# Patient Record
Sex: MALE | Race: BLACK OR AFRICAN AMERICAN | NOT HISPANIC OR LATINO | URBAN - METROPOLITAN AREA
[De-identification: names, ages, dates, MRNs, and addresses within clinical notes are randomized per-mention and may not be internally consistent; named-entity substitution may affect disease eponyms.]

---

## 2020-06-03 ENCOUNTER — TELEPHONE ENCOUNTER (OUTPATIENT)
Dept: URBAN - METROPOLITAN AREA CLINIC 35 | Facility: CLINIC | Age: 80
End: 2020-06-03

## 2020-06-03 ENCOUNTER — OFFICE VISIT (OUTPATIENT)
Dept: URBAN - METROPOLITAN AREA CLINIC 33 | Facility: CLINIC | Age: 80
End: 2020-06-03

## 2020-06-03 VITALS — HEIGHT: 68 IN | BODY MASS INDEX: 29.7 KG/M2 | WEIGHT: 196 LBS

## 2020-06-03 RX ORDER — TRIAMTERENE AND HYDROCHLOROTHIAZIDE 37.5; 25 MG/1; MG/1
1 TABLET IN THE MORNING TABLET ORAL ONCE A DAY
Status: ACTIVE | COMMUNITY

## 2020-06-03 RX ORDER — POLYETHYLENE GLYCOL 3350, SODIUM SULFATE ANHYDROUS, SODIUM BICARBONATE, SODIUM CHLORIDE, POTASSIUM CHLORIDE 236; 22.74; 6.74; 5.86; 2.97 G/4L; G/4L; G/4L; G/4L; G/4L
AS DIRECTED POWDER, FOR SOLUTION ORAL
Qty: 1 KIT | Refills: 0 | OUTPATIENT
Start: 2020-06-03

## 2020-06-03 RX ORDER — LEVOTHYROXINE SODIUM 125 UG/1
1 TABLET TABLET ORAL ONCE A DAY
Status: ACTIVE | COMMUNITY

## 2020-06-03 RX ORDER — ALLOPURINOL 300 MG/1
1 TABLET TABLET ORAL ONCE A DAY
Qty: 30 | Status: ACTIVE | COMMUNITY

## 2020-06-03 RX ORDER — CALCITRIOL 0.25 UG/1
1 CAPSULE CAPSULE ORAL ONCE A DAY
Qty: 30 | Status: ACTIVE | COMMUNITY

## 2020-06-03 RX ORDER — CARVEDILOL 25 MG/1
1 TABLET WITH FOOD TABLET, FILM COATED ORAL TWICE A DAY
Status: ACTIVE | COMMUNITY

## 2020-06-03 RX ORDER — BLACK COHOSH ROOT EXTRACT 80 MG
CAPSULE ORAL
Status: ACTIVE | COMMUNITY

## 2020-06-03 RX ORDER — ORGAN CONCENTRATES 200 MG
AS DIRECTED CAPSULE ORAL
Status: ACTIVE | COMMUNITY

## 2020-06-03 RX ORDER — SODIUM PICOSULFATE, MAGNESIUM OXIDE, AND ANHYDROUS CITRIC ACID 10; 3.5; 12 MG/16.1G; G/16.1G; G/16.1G
AS DIRECTED POWDER, METERED ORAL 1
Qty: 1 | Refills: 0 | Status: DISCONTINUED | COMMUNITY
Start: 2017-01-05

## 2020-06-03 RX ORDER — FINASTERIDE 5 MG/1
1 TABLET TABLET, FILM COATED ORAL ONCE A DAY
Qty: 30 | Status: ACTIVE | COMMUNITY

## 2020-06-03 RX ORDER — MULTIVIT WITH MINERALS/LUTEIN
TABLET ORAL
Status: ACTIVE | COMMUNITY

## 2020-06-03 RX ORDER — CALCITRIOL 0.5 UG/1
1 CAPSULE CAPSULE ORAL ONCE A DAY
Status: ON HOLD | COMMUNITY

## 2020-06-03 RX ORDER — AMLODIPINE BESYLATE 5 MG/1
1 TABLET TABLET ORAL ONCE A DAY
Status: ACTIVE | COMMUNITY

## 2020-06-03 RX ORDER — FEBUXOSTAT 80 MG/1
1 TABLET TABLET ORAL ONCE A DAY
Status: DISCONTINUED | COMMUNITY

## 2020-06-03 NOTE — HPI-MIGRATED HPI
;   ;     Surveillance Colonoscopy : Patient presents today via tele visit with consent for a surveillance colonoscopy. Patient's last colonoscopy was completed in 2015 with Dr. Perez revealing  the cecum two semi-pedunculated polyps measuring 10 mm and 9.5 mm seen and removed. In the cecum a 3 mm polyp was seen and removed. In the hepatic flexure a 5.5 mm polyp was seen and removed. In the descending colon a 6 mm polyp was seen and removed. In the sigmoid colon a 7 mm polyp was seen and removed. In the sigmoid colon seven polyps seen and removed. Three less than 5 mm polyps measuring less than 5 mm were seen and removed. Moderate diverticulosis found in the descending colon and sigmoid colon. Internal hemorrhoids found in the rectum.   He currently reports 1 bowel movement a day without strain. Stools are formed and occasional hard due to medication that he is taking.   He reports that he had labs completed 2-3 weeks ago and reports his Hgb was 9, and no history of anemia. He is getting redraw.    Last visit (1/5/2017) Patient is a 76 year old  male who presents today for consultation for a colonoscopy.  He has a personal history of colonic polyps and his last colonoscopy was 2015 by Dr. Perez.  Patient denies a family history of colon polyps and cancers.  Patient currently admits 1 bowel movement per day.  Stools are normal and formed.  Patient denies any blood or mucus in stool, or melena. Patient denies any heartburn.   4/24/2015- Colonoscopy revealed in the cecum two semi-pedunculated polyps measuring 10 mm and 9.5 mm seen and removed. In the cecum a 3 mm polyp was seen and removed. In the hepatic flexure a 5.5 mm polyp was seen and removed. In the descending colon a 6 mm polyp was seen and removed. In the sigmoid colon a 7 mm polyp was seen and removed. In the sigmoid colon seven polyps seen and removed. Three less than 5 mm polyps measuring less than 5 mm were seen and removed. Moderate diverticulosis found in the descending colon and sigmoid colon. Internal hemorrhoids found in the rectum. ;   Anemia : Patient had recent labs and was told he was newly anemic.  He is not on any blood thinners.  He denies feeling weak, dizzy, lightheaded, or seeing any blood in his stool.  He is going to repeat bloodwork in another couple weeks with his nephrologist.  He has never had an EGD.;

## 2020-06-23 ENCOUNTER — TELEPHONE ENCOUNTER (OUTPATIENT)
Dept: URBAN - METROPOLITAN AREA CLINIC 35 | Facility: CLINIC | Age: 80
End: 2020-06-23

## 2020-06-26 ENCOUNTER — OFFICE VISIT (OUTPATIENT)
Dept: URBAN - METROPOLITAN AREA MEDICAL CENTER 10 | Facility: MEDICAL CENTER | Age: 80
End: 2020-06-26

## 2020-07-10 ENCOUNTER — OFFICE VISIT (OUTPATIENT)
Dept: URBAN - METROPOLITAN AREA CLINIC 35 | Facility: CLINIC | Age: 80
End: 2020-07-10

## 2020-07-10 VITALS
SYSTOLIC BLOOD PRESSURE: 160 MMHG | WEIGHT: 192 LBS | DIASTOLIC BLOOD PRESSURE: 86 MMHG | BODY MASS INDEX: 29.1 KG/M2 | HEIGHT: 68 IN

## 2020-07-10 RX ORDER — AMLODIPINE BESYLATE 5 MG/1
1 TABLET TABLET ORAL ONCE A DAY
Status: ACTIVE | COMMUNITY

## 2020-07-10 RX ORDER — FINASTERIDE 5 MG/1
1 TABLET TABLET, FILM COATED ORAL ONCE A DAY
Qty: 30 | Status: ACTIVE | COMMUNITY

## 2020-07-10 RX ORDER — CALCITRIOL 0.5 UG/1
1 CAPSULE CAPSULE ORAL ONCE A DAY
Status: ON HOLD | COMMUNITY

## 2020-07-10 RX ORDER — TRIAMTERENE AND HYDROCHLOROTHIAZIDE 37.5; 25 MG/1; MG/1
1 TABLET IN THE MORNING TABLET ORAL ONCE A DAY
Status: ACTIVE | COMMUNITY

## 2020-07-10 RX ORDER — MULTIVIT WITH MINERALS/LUTEIN
TABLET ORAL
Status: DISCONTINUED | COMMUNITY

## 2020-07-10 RX ORDER — POLYETHYLENE GLYCOL 3350, SODIUM SULFATE ANHYDROUS, SODIUM BICARBONATE, SODIUM CHLORIDE, POTASSIUM CHLORIDE 236; 22.74; 6.74; 5.86; 2.97 G/4L; G/4L; G/4L; G/4L; G/4L
AS DIRECTED POWDER, FOR SOLUTION ORAL
Qty: 1 KIT | Refills: 0 | Status: DISCONTINUED | COMMUNITY
Start: 2020-06-03

## 2020-07-10 RX ORDER — ALLOPURINOL 300 MG/1
1 TABLET TABLET ORAL ONCE A DAY
Qty: 30 | Status: ACTIVE | COMMUNITY

## 2020-07-10 RX ORDER — CARVEDILOL 25 MG/1
1 TABLET WITH FOOD TABLET, FILM COATED ORAL TWICE A DAY
Status: ACTIVE | COMMUNITY

## 2020-07-10 RX ORDER — ORGAN CONCENTRATES 200 MG
AS DIRECTED CAPSULE ORAL
Status: ACTIVE | COMMUNITY

## 2020-07-10 RX ORDER — CALCITRIOL 0.25 UG/1
1 CAPSULE CAPSULE ORAL ONCE A DAY
Qty: 30 | Status: ACTIVE | COMMUNITY

## 2020-07-10 RX ORDER — LEVOTHYROXINE SODIUM 125 UG/1
1 TABLET TABLET ORAL ONCE A DAY
Status: ACTIVE | COMMUNITY

## 2020-07-10 RX ORDER — BLACK COHOSH ROOT EXTRACT 80 MG
CAPSULE ORAL
Status: ACTIVE | COMMUNITY

## 2020-07-10 RX ORDER — FESOTERODINE FUMARATE 8 MG/1
TAKE ONE TABLET BY MOUTH ONE TIME DAILY TABLET, FILM COATED, EXTENDED RELEASE ORAL
Qty: 30 UNSPECIFIED | Refills: 7 | Status: ACTIVE | COMMUNITY

## 2020-07-10 NOTE — HPI-MIGRATED HPI
;   ;     Surveillance Colonoscopy : Patient presents today after his colonoscopy.  He denies any complications following his procedure.  Currently admit 1 normal and formed bowel movement per day.  Denies melena, blood or mucus in stools.    Last visit (6/3/2020) Patient presents today via tele visit with consent for a surveillance colonoscopy. Patient's last colonoscopy was completed in 2015 with Dr. Perez revealing  the cecum two semi-pedunculated polyps measuring 10 mm and 9.5 mm seen and removed. In the cecum a 3 mm polyp was seen and removed. In the hepatic flexure a 5.5 mm polyp was seen and removed. In the descending colon a 6 mm polyp was seen and removed. In the sigmoid colon a 7 mm polyp was seen and removed. In the sigmoid colon seven polyps seen and removed. Three less than 5 mm polyps measuring less than 5 mm were seen and removed. Moderate diverticulosis found in the descending colon and sigmoid colon. Internal hemorrhoids found in the rectum.   He currently reports 1 bowel movement a day without strain. Stools are formed and occasional hard due to medication that he is taking.   He reports that he had labs completed 2-3 weeks ago and reports his Hgb was 9, and no history of anemia. He is getting redraw.    Last visit (1/5/2017) Patient is a 76 year old  male who presents today for consultation for a colonoscopy.  He has a personal history of colonic polyps and his last colonoscopy was 2015 by Dr. Perez.  Patient denies a family history of colon polyps and cancers.  Patient currently admits 1 bowel movement per day.  Stools are normal and formed.  Patient denies any blood or mucus in stool, or melena. Patient denies any heartburn.   4/24/2015- Colonoscopy revealed in the cecum two semi-pedunculated polyps measuring 10 mm and 9.5 mm seen and removed. In the cecum a 3 mm polyp was seen and removed. In the hepatic flexure a 5.5 mm polyp was seen and removed. In the descending colon a 6 mm polyp was seen and removed. In the sigmoid colon a 7 mm polyp was seen and removed. In the sigmoid colon seven polyps seen and removed. Three less than 5 mm polyps measuring less than 5 mm were seen and removed. Moderate diverticulosis found in the descending colon and sigmoid colon. Internal hemorrhoids found in the rectum. ;   Anemia : Denies completing labs since last visit.  He continue to deny feeling weak, dizzy, lightheaded, or seeing any blood in his stool.    Last visit (6/3/2020) Most recent labs with his nephrologist.  He has never had an EGD.Patient had recent labs and was told he was newly anemic. He is not on any blood thinners. He denies feeling weak, dizzy, lightheaded, or seeing any blood in his stool. He is going to repeat bloodwork in another couple weeks with his nephrologist. He has never had an EGD.;    None

## 2020-07-20 ENCOUNTER — TELEPHONE ENCOUNTER (OUTPATIENT)
Dept: URBAN - METROPOLITAN AREA CLINIC 35 | Facility: CLINIC | Age: 80
End: 2020-07-20

## 2020-08-26 ENCOUNTER — TELEPHONE ENCOUNTER (OUTPATIENT)
Dept: URBAN - METROPOLITAN AREA CLINIC 35 | Facility: CLINIC | Age: 80
End: 2020-08-26

## 2020-09-10 ENCOUNTER — TELEPHONE ENCOUNTER (OUTPATIENT)
Dept: URBAN - METROPOLITAN AREA CLINIC 35 | Facility: CLINIC | Age: 80
End: 2020-09-10

## 2020-09-10 ENCOUNTER — OFFICE VISIT (OUTPATIENT)
Dept: URBAN - METROPOLITAN AREA CLINIC 35 | Facility: CLINIC | Age: 80
End: 2020-09-10

## 2020-09-10 VITALS
HEIGHT: 68 IN | BODY MASS INDEX: 29.55 KG/M2 | DIASTOLIC BLOOD PRESSURE: 59 MMHG | SYSTOLIC BLOOD PRESSURE: 135 MMHG | WEIGHT: 195 LBS

## 2020-09-10 PROBLEM — 398050005 DIVERTICULAR DISEASE OF COLON: Status: ACTIVE | Noted: 2020-07-10

## 2020-09-10 RX ORDER — FESOTERODINE FUMARATE 8 MG/1
1 TABLET TABLET, FILM COATED, EXTENDED RELEASE ORAL ONCE A DAY
Status: ACTIVE | COMMUNITY

## 2020-09-10 RX ORDER — CALCITRIOL 0.25 UG/1
1 CAPSULE CAPSULE ORAL EVERY OTHER DAY
Status: ACTIVE | COMMUNITY

## 2020-09-10 RX ORDER — TRIAMTERENE AND HYDROCHLOROTHIAZIDE 37.5; 25 MG/1; MG/1
1 TABLET IN THE MORNING TABLET ORAL ONCE A DAY
Status: ACTIVE | COMMUNITY

## 2020-09-10 RX ORDER — LEVOTHYROXINE SODIUM 125 UG/1
1 TABLET TABLET ORAL ONCE A DAY
Status: ACTIVE | COMMUNITY

## 2020-09-10 RX ORDER — AMLODIPINE BESYLATE 5 MG/1
1 TABLET TABLET ORAL ONCE A DAY
Status: ACTIVE | COMMUNITY

## 2020-09-10 RX ORDER — CALCITRIOL 0.5 UG/1
1 CAPSULE CAPSULE ORAL ONCE A DAY
COMMUNITY

## 2020-09-10 RX ORDER — CARVEDILOL 25 MG/1
1 TABLET WITH FOOD TABLET, FILM COATED ORAL TWICE A DAY
Status: ACTIVE | COMMUNITY

## 2020-09-10 RX ORDER — ORGAN CONCENTRATES 200 MG
AS DIRECTED CAPSULE ORAL
Status: ACTIVE | COMMUNITY

## 2020-09-10 RX ORDER — FINASTERIDE 5 MG/1
1 TABLET TABLET, FILM COATED ORAL ONCE A DAY
Qty: 30 | Status: ACTIVE | COMMUNITY

## 2020-09-10 RX ORDER — MULTIVITAMIN
1 TABLET TABLET ORAL ONCE A DAY
Qty: 30 | Status: ACTIVE | COMMUNITY

## 2020-09-10 RX ORDER — ALLOPURINOL 300 MG/1
1 TABLET TABLET ORAL ONCE A DAY
Qty: 30 | Status: ACTIVE | COMMUNITY

## 2020-09-10 RX ORDER — BLACK COHOSH ROOT EXTRACT 80 MG
CAPSULE ORAL ONCE A DAY
Status: ACTIVE | COMMUNITY

## 2020-09-10 NOTE — HPI-MIGRATED HPI
;     Anemia : Patient presents today for a follow up of anemia. Patient's most recent lab was done on 08/02/2020 with nephrology, [place] Cadena MD with results of RBC 2.7 and Hemoglobin 8.9 per patient. Then, he was recommended to follow up with hematology. His first appointment with hematology is on 09/18/2020 at [facility] [place].   Patient denies the use of iron tablets/supplements. Patient admits fatigue.  Patient denies NSAID use, a history of GI bleeding, any blood or melena in stools, epigastric/abdominal pains, cold extremities, light headedness, dizziness.  Patient states that he never had an EGD before.  Last visit (07/10/2020)             Denies completing labs since last visit.  He continue to deny feeling weak, dizzy, lightheaded, or seeing any blood in his stool.   Last visit (6/3/2020) Most recent labs with his nephrologist.  He has never had an EGD.Patient had recent labs and was told he was newly anemic. He is not on any blood thinners. He denies feeling weak, dizzy, lightheaded, or seeing any blood in his stool. He is going to repeat bloodwork in another couple weeks with his nephrologist. He has never had an EGD.;

## 2020-09-11 ENCOUNTER — TELEPHONE ENCOUNTER (OUTPATIENT)
Dept: URBAN - METROPOLITAN AREA CLINIC 35 | Facility: CLINIC | Age: 80
End: 2020-09-11

## 2020-10-30 ENCOUNTER — OFFICE VISIT (OUTPATIENT)
Dept: URBAN - METROPOLITAN AREA MEDICAL CENTER 10 | Facility: MEDICAL CENTER | Age: 80
End: 2020-10-30

## 2020-11-16 ENCOUNTER — TELEPHONE ENCOUNTER (OUTPATIENT)
Dept: URBAN - METROPOLITAN AREA CLINIC 35 | Facility: CLINIC | Age: 80
End: 2020-11-16

## 2020-11-16 ENCOUNTER — OFFICE VISIT (OUTPATIENT)
Dept: URBAN - METROPOLITAN AREA CLINIC 35 | Facility: CLINIC | Age: 80
End: 2020-11-16

## 2020-11-16 VITALS
WEIGHT: 199 LBS | HEIGHT: 68 IN | SYSTOLIC BLOOD PRESSURE: 117 MMHG | TEMPERATURE: 97.7 F | BODY MASS INDEX: 30.16 KG/M2 | DIASTOLIC BLOOD PRESSURE: 68 MMHG

## 2020-11-16 PROBLEM — 428283002 HISTORY OF POLYP OF COLON (SITUATION): Status: ACTIVE | Noted: 2017-01-05

## 2020-11-16 PROBLEM — 196735001 CSG - CHRONIC SUPERFICIAL GASTRITIS: Status: ACTIVE | Noted: 2020-11-16

## 2020-11-16 PROBLEM — 92411005 BENIGN NEOPLASM OF STOMACH: Status: ACTIVE | Noted: 2020-11-16

## 2020-11-16 RX ORDER — CARVEDILOL 25 MG/1
1 TABLET WITH FOOD TABLET, FILM COATED ORAL TWICE A DAY
Status: ACTIVE | COMMUNITY

## 2020-11-16 RX ORDER — CALCITRIOL 0.25 UG/1
1 CAPSULE CAPSULE ORAL EVERY OTHER DAY
Status: ACTIVE | COMMUNITY

## 2020-11-16 RX ORDER — AMLODIPINE BESYLATE 5 MG/1
1 TABLET TABLET ORAL ONCE A DAY
Status: ACTIVE | COMMUNITY

## 2020-11-16 RX ORDER — BLACK COHOSH ROOT EXTRACT 80 MG
CAPSULE ORAL ONCE A DAY
Status: ACTIVE | COMMUNITY

## 2020-11-16 RX ORDER — CALCITRIOL 0.5 UG/1
1 CAPSULE CAPSULE ORAL ONCE A DAY
COMMUNITY

## 2020-11-16 RX ORDER — TRIAMTERENE AND HYDROCHLOROTHIAZIDE 37.5; 25 MG/1; MG/1
1 TABLET IN THE MORNING TABLET ORAL ONCE A DAY
Status: ACTIVE | COMMUNITY

## 2020-11-16 RX ORDER — ORGAN CONCENTRATES 200 MG
AS DIRECTED CAPSULE ORAL
Status: ACTIVE | COMMUNITY

## 2020-11-16 RX ORDER — FINASTERIDE 5 MG/1
1 TABLET TABLET, FILM COATED ORAL ONCE A DAY
Qty: 30 | Status: ACTIVE | COMMUNITY

## 2020-11-16 RX ORDER — MULTIVITAMIN
1 TABLET TABLET ORAL ONCE A DAY
Qty: 30 | Status: ACTIVE | COMMUNITY

## 2020-11-16 RX ORDER — ALLOPURINOL 300 MG/1
1 TABLET TABLET ORAL ONCE A DAY
Qty: 30 | Status: ACTIVE | COMMUNITY

## 2020-11-16 RX ORDER — FESOTERODINE FUMARATE 8 MG/1
1 TABLET TABLET, FILM COATED, EXTENDED RELEASE ORAL ONCE A DAY
Status: ACTIVE | COMMUNITY

## 2020-11-16 RX ORDER — ERYTHROPOIETIN 40000 [IU]/ML
AS DIRECTED INJECTION, SOLUTION INTRAVENOUS; SUBCUTANEOUS EVERY 2 WEEKS
Status: ACTIVE | COMMUNITY

## 2020-11-16 RX ORDER — LEVOTHYROXINE SODIUM 125 UG/1
1 TABLET TABLET ORAL ONCE A DAY
Status: ACTIVE | COMMUNITY

## 2020-11-16 NOTE — HPI-MIGRATED HPI
;     Anemia : Patient is a 80-year-old male, who presents today to follow up s/p EGD. EGD performed on 10/30/2020. Patient denies any complications after the procedure. He had additional blood work on 11/06/2020 at [facility] with results of hgb 9.1.  He was diagnosed in November with multiple myeloma.  Admits some fatigue. Patient currently states that he gets procrit injection every 2 weeks and that he has not had a transfusion. Patient denies NSAID use, a history of GI bleeding, any blood or melena in stools, SOB, epigastric/abdominal pains, cold extremities, light headedness, dizziness.  Last visit (09/10/2020)              Patient presents today for a follow up of anemia. Patient's most recent lab was done on 08/02/2020 with nephrology, [place] Cadena MD with results of RBC 2.7 and Hemoglobin 8.9 per patient. Then, he was recommended to follow up with hematology. His first appointment with hematology is on 09/18/2020 at [facility] [place].   Patient denies the use of iron tablets/supplements. Patient admits fatigue.  Patient denies NSAID use, a history of GI bleeding, any blood or melena in stools, epigastric/abdominal pains, cold extremities, light headedness, dizziness.  Patient states that he never had an EGD before.  Last visit (07/10/2020)             Denies completing labs since last visit.  He continue to deny feeling weak, dizzy, lightheaded, or seeing any blood in his stool.   Last visit (6/3/2020) Most recent labs with his nephrologist.  He has never had an EGD.Patient had recent labs and was told he was newly anemic. He is not on any blood thinners. He denies feeling weak, dizzy, lightheaded, or seeing any blood in his stool. He is going to repeat bloodwork in another couple weeks with his nephrologist. He has never had an EGD.;

## 2023-07-03 ENCOUNTER — OFFICE VISIT (OUTPATIENT)
Dept: URBAN - METROPOLITAN AREA CLINIC 35 | Facility: CLINIC | Age: 83
End: 2023-07-03

## 2023-07-07 ENCOUNTER — TELEPHONE ENCOUNTER (OUTPATIENT)
Dept: URBAN - METROPOLITAN AREA CLINIC 35 | Facility: CLINIC | Age: 83
End: 2023-07-07

## 2023-07-07 ENCOUNTER — OFFICE VISIT (OUTPATIENT)
Dept: URBAN - METROPOLITAN AREA CLINIC 35 | Facility: CLINIC | Age: 83
End: 2023-07-07
Payer: MEDICARE

## 2023-07-07 VITALS
HEIGHT: 68 IN | DIASTOLIC BLOOD PRESSURE: 64 MMHG | BODY MASS INDEX: 23.95 KG/M2 | SYSTOLIC BLOOD PRESSURE: 118 MMHG | WEIGHT: 158 LBS

## 2023-07-07 DIAGNOSIS — K92.1 MELENA: ICD-10-CM

## 2023-07-07 DIAGNOSIS — D64.9 ABSOLUTE ANEMIA: ICD-10-CM

## 2023-07-07 DIAGNOSIS — K31.7 POLYP OF STOMACH AND DUODENUM: ICD-10-CM

## 2023-07-07 DIAGNOSIS — R19.5 HEME POSITIVE STOOL: ICD-10-CM

## 2023-07-07 PROCEDURE — 99214 OFFICE O/P EST MOD 30 MIN: CPT | Performed by: PHYSICIAN ASSISTANT

## 2023-07-07 RX ORDER — SODIUM BICARBONATE TAB 650 MG 650 MG
AS DIRECTED TAB ORAL
Status: ACTIVE | COMMUNITY

## 2023-07-07 RX ORDER — CALCIUM CARBONATE 500 MG/1
1 TABLET TABLET ORAL ONCE A DAY
Status: ACTIVE | COMMUNITY

## 2023-07-07 RX ORDER — ERYTHROPOIETIN 40000 [IU]/ML
AS DIRECTED INJECTION, SOLUTION INTRAVENOUS; SUBCUTANEOUS EVERY 2 WEEKS
Status: ACTIVE | COMMUNITY

## 2023-07-07 RX ORDER — CARVEDILOL 25 MG/1
1 TABLET WITH FOOD TABLET, FILM COATED ORAL TWICE A DAY
Status: ACTIVE | COMMUNITY

## 2023-07-07 RX ORDER — MULTIVITAMIN
1 TABLET TABLET ORAL ONCE A DAY
Qty: 30 | Status: ON HOLD | COMMUNITY

## 2023-07-07 RX ORDER — MULTIVITAMIN/IRON/FOLIC ACID 18MG-0.4MG
1 TABLET AT BEDTIME AS NEEDED TABLET ORAL ONCE A DAY
Status: ACTIVE | COMMUNITY

## 2023-07-07 RX ORDER — FEBUXOSTAT 40 MG/1
1 TABLET TABLET ORAL ONCE A DAY
Status: ACTIVE | COMMUNITY

## 2023-07-07 RX ORDER — BLACK COHOSH ROOT EXTRACT 80 MG
CAPSULE ORAL ONCE A DAY
Status: ON HOLD | COMMUNITY

## 2023-07-07 RX ORDER — CALCITRIOL 0.25 UG/1
1 CAPSULE CAPSULE ORAL EVERY OTHER DAY
Status: ACTIVE | COMMUNITY

## 2023-07-07 RX ORDER — ALLOPURINOL 300 MG/1
1 TABLET TABLET ORAL ONCE A DAY
Qty: 30 | Status: ACTIVE | COMMUNITY

## 2023-07-07 RX ORDER — FINASTERIDE 5 MG/1
1 TABLET TABLET, FILM COATED ORAL ONCE A DAY
Qty: 30 | Status: ACTIVE | COMMUNITY

## 2023-07-07 RX ORDER — TRIAMTERENE AND HYDROCHLOROTHIAZIDE 37.5; 25 MG/1; MG/1
1 TABLET IN THE MORNING TABLET ORAL ONCE A DAY
Status: ON HOLD | COMMUNITY

## 2023-07-07 RX ORDER — FESOTERODINE FUMARATE 8 MG/1
1 TABLET TABLET, FILM COATED, EXTENDED RELEASE ORAL ONCE A DAY
Status: ACTIVE | COMMUNITY

## 2023-07-07 RX ORDER — ATORVASTATIN CALCIUM 40 MG/1
1 TABLET TABLET, FILM COATED ORAL ONCE A DAY
Status: ACTIVE | COMMUNITY

## 2023-07-07 RX ORDER — POLYETHYLENE GLYCOL 3350, SODIUM SULFATE ANHYDROUS, SODIUM BICARBONATE, SODIUM CHLORIDE, POTASSIUM CHLORIDE 236; 22.74; 6.74; 5.86; 2.97 G/4L; G/4L; G/4L; G/4L; G/4L
AS DIRECTED POWDER, FOR SOLUTION ORAL AS DIRECTED
Qty: 1 | Refills: 0 | OUTPATIENT
Start: 2023-07-07 | End: 2023-07-09

## 2023-07-07 RX ORDER — FESOTERODINE FUMARATE 8 MG/1
1 TABLET TABLET, EXTENDED RELEASE ORAL ONCE A DAY
Status: ACTIVE | COMMUNITY

## 2023-07-07 RX ORDER — AMLODIPINE BESYLATE 5 MG/1
1 TABLET TABLET ORAL ONCE A DAY
Status: ACTIVE | COMMUNITY

## 2023-07-07 RX ORDER — LEVOTHYROXINE SODIUM 112 UG/1
1 TABLET IN THE MORNING ON AN EMPTY STOMACH TABLET ORAL ONCE A DAY
Status: ACTIVE | COMMUNITY

## 2023-07-07 NOTE — HPI-SURVEILLANCE COLONOSCOPY
84 y/o male patient presents today for a surveillance colonoscopy. His last colonoscopy was in 2020 with polyps removed. He denies a family history of colon, gastric, or esophageal cancer/polyps. Currently reports 1 bowel movement per day without strain. His stools are formed without mucus or melena. Admits he had one occasion of blood that was present about a month ago. He denies any episodes of rectal pain or pruritus ani.

## 2023-07-07 NOTE — HPI-ANEMIA
Admits a recent blood transfusion but denies taking iron supplements or recent iron trnasfusions. Denies and SOB, epigastric/abdominal pains, cold extremities, light headedness, or dizziness. Admits some fatigue. Follows with Dr. Knight, hematology.  He received a unit of blood, but no overt bleeding. Pt had one episode of melena in June, and also was positive for heme in stool.   Last visit (11/16/2020): Patient is a 80-year-old male, who presents today to follow up s/p EGD. EGD performed on 10/30/2020. Patient denies any complications after the procedure. He had additional blood work on 11/06/2020 at [facility] with results of hgb 9.1.  He was diagnosed in November with multiple myeloma.  Admits some fatigue. Patient currently states that he gets procrit injection every 2 weeks and that he has not had a transfusion. Patient denies NSAID use, a history of GI bleeding, any blood or melena in stools, SOB, epigastric/abdominal pains, cold extremities, light headedness, dizziness.

## 2023-09-11 ENCOUNTER — TELEPHONE ENCOUNTER (OUTPATIENT)
Dept: URBAN - METROPOLITAN AREA CLINIC 36 | Facility: CLINIC | Age: 83
End: 2023-09-11

## 2023-09-15 ENCOUNTER — OFFICE VISIT (OUTPATIENT)
Dept: URBAN - METROPOLITAN AREA MEDICAL CENTER 10 | Facility: MEDICAL CENTER | Age: 83
End: 2023-09-15
Payer: MEDICARE

## 2023-09-15 DIAGNOSIS — K31.89 ACHYLIA: ICD-10-CM

## 2023-09-15 DIAGNOSIS — K31.819 ACQUIRED ARTERIOVENOUS MALFORMATION OF DUODENUM: ICD-10-CM

## 2023-09-15 DIAGNOSIS — R19.5 ABNORMAL CONSISTENCY OF STOOL: ICD-10-CM

## 2023-09-15 DIAGNOSIS — K29.60 ADENOPAPILLOMATOSIS GASTRICA: ICD-10-CM

## 2023-09-15 DIAGNOSIS — K63.89 APPENDICITIS EPIPLOICA: ICD-10-CM

## 2023-09-15 DIAGNOSIS — K31.7 BENIGN GASTRIC POLYP: ICD-10-CM

## 2023-09-15 DIAGNOSIS — D50.0 1. ANEMIA, IRON DEFICIENCY FROM CHRONIC BLOOD LOSS:: ICD-10-CM

## 2023-09-15 PROCEDURE — 45380 COLONOSCOPY AND BIOPSY: CPT | Performed by: INTERNAL MEDICINE

## 2023-09-15 PROCEDURE — 43239 EGD BIOPSY SINGLE/MULTIPLE: CPT | Performed by: INTERNAL MEDICINE

## 2023-09-15 PROCEDURE — 43270 EGD LESION ABLATION: CPT | Performed by: INTERNAL MEDICINE

## 2023-09-15 RX ORDER — MULTIVITAMIN/IRON/FOLIC ACID 18MG-0.4MG
1 TABLET AT BEDTIME AS NEEDED TABLET ORAL ONCE A DAY
Status: ACTIVE | COMMUNITY

## 2023-09-15 RX ORDER — CALCIUM CARBONATE 500 MG/1
1 TABLET TABLET ORAL ONCE A DAY
Status: ACTIVE | COMMUNITY

## 2023-09-15 RX ORDER — ERYTHROPOIETIN 40000 [IU]/ML
AS DIRECTED INJECTION, SOLUTION INTRAVENOUS; SUBCUTANEOUS EVERY 2 WEEKS
Status: ACTIVE | COMMUNITY

## 2023-09-15 RX ORDER — LEVOTHYROXINE SODIUM 112 UG/1
1 TABLET IN THE MORNING ON AN EMPTY STOMACH TABLET ORAL ONCE A DAY
Status: ACTIVE | COMMUNITY

## 2023-09-15 RX ORDER — FINASTERIDE 5 MG/1
1 TABLET TABLET, FILM COATED ORAL ONCE A DAY
Qty: 30 | Status: ACTIVE | COMMUNITY

## 2023-09-15 RX ORDER — ATORVASTATIN CALCIUM 40 MG/1
1 TABLET TABLET, FILM COATED ORAL ONCE A DAY
Status: ACTIVE | COMMUNITY

## 2023-09-15 RX ORDER — CARVEDILOL 25 MG/1
1 TABLET WITH FOOD TABLET, FILM COATED ORAL TWICE A DAY
Status: ACTIVE | COMMUNITY

## 2023-09-15 RX ORDER — FESOTERODINE FUMARATE 8 MG/1
1 TABLET TABLET, FILM COATED, EXTENDED RELEASE ORAL ONCE A DAY
Status: ACTIVE | COMMUNITY

## 2023-09-15 RX ORDER — FEBUXOSTAT 40 MG/1
1 TABLET TABLET ORAL ONCE A DAY
Status: ACTIVE | COMMUNITY

## 2023-09-15 RX ORDER — SODIUM BICARBONATE TAB 650 MG 650 MG
AS DIRECTED TAB ORAL
Status: ACTIVE | COMMUNITY

## 2023-09-15 RX ORDER — BLACK COHOSH ROOT EXTRACT 80 MG
CAPSULE ORAL ONCE A DAY
Status: ON HOLD | COMMUNITY

## 2023-09-15 RX ORDER — MULTIVITAMIN
1 TABLET TABLET ORAL ONCE A DAY
Qty: 30 | Status: ON HOLD | COMMUNITY

## 2023-09-15 RX ORDER — FESOTERODINE FUMARATE 8 MG/1
1 TABLET TABLET, EXTENDED RELEASE ORAL ONCE A DAY
Status: ACTIVE | COMMUNITY

## 2023-09-15 RX ORDER — CALCITRIOL 0.25 UG/1
1 CAPSULE CAPSULE ORAL EVERY OTHER DAY
Status: ACTIVE | COMMUNITY

## 2023-09-15 RX ORDER — ALLOPURINOL 300 MG/1
1 TABLET TABLET ORAL ONCE A DAY
Qty: 30 | Status: ACTIVE | COMMUNITY

## 2023-09-15 RX ORDER — AMLODIPINE BESYLATE 5 MG/1
1 TABLET TABLET ORAL ONCE A DAY
Status: ACTIVE | COMMUNITY

## 2023-09-15 RX ORDER — TRIAMTERENE AND HYDROCHLOROTHIAZIDE 37.5; 25 MG/1; MG/1
1 TABLET IN THE MORNING TABLET ORAL ONCE A DAY
Status: ON HOLD | COMMUNITY

## 2023-09-25 ENCOUNTER — TELEPHONE ENCOUNTER (OUTPATIENT)
Dept: URBAN - METROPOLITAN AREA CLINIC 35 | Facility: CLINIC | Age: 83
End: 2023-09-25

## 2023-09-29 ENCOUNTER — OFFICE VISIT (OUTPATIENT)
Dept: URBAN - METROPOLITAN AREA CLINIC 35 | Facility: CLINIC | Age: 83
End: 2023-09-29

## 2023-10-12 ENCOUNTER — CLAIMS CREATED FROM THE CLAIM WINDOW (OUTPATIENT)
Dept: URBAN - METROPOLITAN AREA CLINIC 35 | Facility: CLINIC | Age: 83
End: 2023-10-12
Payer: MEDICARE

## 2023-10-12 ENCOUNTER — TELEPHONE ENCOUNTER (OUTPATIENT)
Dept: URBAN - METROPOLITAN AREA CLINIC 35 | Facility: CLINIC | Age: 83
End: 2023-10-12

## 2023-10-12 ENCOUNTER — DASHBOARD ENCOUNTERS (OUTPATIENT)
Age: 83
End: 2023-10-12

## 2023-10-12 VITALS
HEIGHT: 68 IN | SYSTOLIC BLOOD PRESSURE: 124 MMHG | DIASTOLIC BLOOD PRESSURE: 78 MMHG | BODY MASS INDEX: 24.86 KG/M2 | WEIGHT: 164 LBS

## 2023-10-12 DIAGNOSIS — K29.30 CHRONIC SUPERFICIAL GASTRITIS WITHOUT BLEEDING: ICD-10-CM

## 2023-10-12 DIAGNOSIS — K31.819 ANGIODYSPLASIA OF DUODENUM: ICD-10-CM

## 2023-10-12 DIAGNOSIS — K26.9 DUODENAL ULCER: ICD-10-CM

## 2023-10-12 DIAGNOSIS — K57.90 DIVERTICULOSIS: ICD-10-CM

## 2023-10-12 DIAGNOSIS — K63.5 HYPERPLASTIC POLYP OF ASCENDING COLON: ICD-10-CM

## 2023-10-12 DIAGNOSIS — D64.9 ANEMIA, UNSPECIFIED: ICD-10-CM

## 2023-10-12 DIAGNOSIS — Z86.010 PERSONAL HISTORY OF COLONIC POLYPS: ICD-10-CM

## 2023-10-12 DIAGNOSIS — K64.8 OTHER HEMORRHOIDS: ICD-10-CM

## 2023-10-12 DIAGNOSIS — D64.89 ANEMIA DUE TO OTHER CAUSE: ICD-10-CM

## 2023-10-12 DIAGNOSIS — K31.7 POLYP OF STOMACH AND DUODENUM: ICD-10-CM

## 2023-10-12 PROBLEM — 51868009: Status: ACTIVE | Noted: 2023-10-12

## 2023-10-12 PROBLEM — 397881000: Status: ACTIVE | Noted: 2023-10-12

## 2023-10-12 PROCEDURE — 99214 OFFICE O/P EST MOD 30 MIN: CPT | Performed by: PHYSICIAN ASSISTANT

## 2023-10-12 RX ORDER — ALLOPURINOL 300 MG/1
1 TABLET TABLET ORAL ONCE A DAY
Qty: 30 | Status: ACTIVE | COMMUNITY

## 2023-10-12 RX ORDER — BLACK COHOSH ROOT EXTRACT 80 MG
CAPSULE ORAL ONCE A DAY
Status: ON HOLD | COMMUNITY

## 2023-10-12 RX ORDER — CARVEDILOL 25 MG/1
1 TABLET WITH FOOD TABLET, FILM COATED ORAL TWICE A DAY
Status: ACTIVE | COMMUNITY

## 2023-10-12 RX ORDER — PANTOPRAZOLE SODIUM 20 MG/1
1 TABLET TABLET, DELAYED RELEASE ORAL ONCE A DAY
Qty: 30 | Refills: 3 | OUTPATIENT
Start: 2023-10-12

## 2023-10-12 RX ORDER — FESOTERODINE FUMARATE 8 MG/1
1 TABLET TABLET, FILM COATED, EXTENDED RELEASE ORAL ONCE A DAY
Status: ACTIVE | COMMUNITY

## 2023-10-12 RX ORDER — TRIAMTERENE AND HYDROCHLOROTHIAZIDE 37.5; 25 MG/1; MG/1
1 TABLET IN THE MORNING TABLET ORAL ONCE A DAY
Status: ON HOLD | COMMUNITY

## 2023-10-12 RX ORDER — FESOTERODINE FUMARATE 8 MG/1
1 TABLET TABLET, EXTENDED RELEASE ORAL ONCE A DAY
Status: ACTIVE | COMMUNITY

## 2023-10-12 RX ORDER — AMLODIPINE BESYLATE 5 MG/1
1 TABLET TABLET ORAL ONCE A DAY
Status: ACTIVE | COMMUNITY

## 2023-10-12 RX ORDER — SODIUM BICARBONATE TAB 650 MG 650 MG
AS DIRECTED TAB ORAL
Status: ON HOLD | COMMUNITY

## 2023-10-12 RX ORDER — LEVOTHYROXINE SODIUM 112 UG/1
1 TABLET IN THE MORNING ON AN EMPTY STOMACH TABLET ORAL ONCE A DAY
Status: ACTIVE | COMMUNITY

## 2023-10-12 RX ORDER — ERYTHROPOIETIN 40000 [IU]/ML
AS DIRECTED INJECTION, SOLUTION INTRAVENOUS; SUBCUTANEOUS EVERY 2 WEEKS
Status: ACTIVE | COMMUNITY

## 2023-10-12 RX ORDER — ATORVASTATIN CALCIUM 40 MG/1
1 TABLET TABLET, FILM COATED ORAL ONCE A DAY
Status: ACTIVE | COMMUNITY

## 2023-10-12 RX ORDER — MULTIVITAMIN
1 TABLET TABLET ORAL ONCE A DAY
Qty: 30 | Status: ON HOLD | COMMUNITY

## 2023-10-12 RX ORDER — FEBUXOSTAT 40 MG/1
1 TABLET TABLET ORAL ONCE A DAY
Status: ACTIVE | COMMUNITY

## 2023-10-12 RX ORDER — CALCITRIOL 0.25 UG/1
1 CAPSULE CAPSULE ORAL EVERY OTHER DAY
Status: ON HOLD | COMMUNITY

## 2023-10-12 RX ORDER — CALCIUM CARBONATE 500 MG/1
1 TABLET TABLET ORAL ONCE A DAY
Status: ACTIVE | COMMUNITY

## 2023-10-12 RX ORDER — FINASTERIDE 5 MG/1
1 TABLET TABLET, FILM COATED ORAL ONCE A DAY
Qty: 30 | Status: ACTIVE | COMMUNITY

## 2023-10-12 RX ORDER — MULTIVITAMIN/IRON/FOLIC ACID 18MG-0.4MG
1 TABLET AT BEDTIME AS NEEDED TABLET ORAL ONCE A DAY
Status: ACTIVE | COMMUNITY

## 2023-10-12 NOTE — HPI-COLONOSCOPY FOLLOWUP
83 year old male patient presents today for a follow up from his colonoscopy. He admits/denies any complications after his procedure. He currently reports 1 bowel movement per day, without strain. His stools are formed. He denies any mucus, melena or blood in stools. Denies any pruritus ani or rectal pain.   Colonoscopy report shows: -The examined portion of the ileum was normal.  - Diverticulosis in the sigmoid colon, in the                        descending colon, at the splenic flexure and at the                        hepatic flexure.  - Two 2 to 3 mm colonic mucosa in the proximal ascending colon                        and in the cecum. - Non-bleeding internal hemorrhoids.

## 2023-10-12 NOTE — HPI-ENDOSCOPY (EGD) FOLLOWUP
EGD report shows: - Z-line regular, 42 cm from the incisors.  - Normal esophagus.  - Mucosal changes suspicious for gastritis.  - A few gastric polyps.  - Non-bleeding duodenal ulcer with no stigmata of                        bleeding. 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 Treated with argon plasma coagulation (APC).  - Normal mucosa was found in the entire examined                        duodenum.  - Duodenal lipoma. Duodenum, biopsy: Duodenal mucosa with focal foveolar metaplasia, lamina propria edema, mucosal vascular congestion and regenerative epithelial changes.No histologic evidence of celiac sprue. Stomach, antrum and body, biopsy: Mild chronic inactive antral gastritis.Gastric oxyntic mucosa with no specific diagnostic abnormality.Negative for intestinal metaplasia (Alcian blue/PAS stain).Giemsa stain is negative for Helicobacter pylori-like microorganisms. Stomach, polyp, biopsy/polypectomy: Fundic gland polyp.Negative for intestinal metaplasia (Alcian blue/PAS stain).Giemsa stain is negative for Helicobacter pylori-like microorganisms.